# Patient Record
Sex: FEMALE | URBAN - METROPOLITAN AREA
[De-identification: names, ages, dates, MRNs, and addresses within clinical notes are randomized per-mention and may not be internally consistent; named-entity substitution may affect disease eponyms.]

---

## 2023-05-01 ENCOUNTER — APPOINTMENT (RX ONLY)
Dept: URBAN - METROPOLITAN AREA CLINIC 84 | Facility: CLINIC | Age: 63
Setting detail: DERMATOLOGY
End: 2023-05-01

## 2023-05-01 DIAGNOSIS — D18.0 HEMANGIOMA: ICD-10-CM

## 2023-05-01 DIAGNOSIS — L82.1 OTHER SEBORRHEIC KERATOSIS: ICD-10-CM

## 2023-05-01 DIAGNOSIS — T63.42 TOXIC EFFECT OF VENOM OF ANTS: ICD-10-CM

## 2023-05-01 DIAGNOSIS — L81.4 OTHER MELANIN HYPERPIGMENTATION: ICD-10-CM

## 2023-05-01 DIAGNOSIS — L57.8 OTHER SKIN CHANGES DUE TO CHRONIC EXPOSURE TO NONIONIZING RADIATION: ICD-10-CM | Status: INADEQUATELY CONTROLLED

## 2023-05-01 DIAGNOSIS — D22 MELANOCYTIC NEVI: ICD-10-CM

## 2023-05-01 DIAGNOSIS — Z71.89 OTHER SPECIFIED COUNSELING: ICD-10-CM

## 2023-05-01 DIAGNOSIS — L57.0 ACTINIC KERATOSIS: ICD-10-CM

## 2023-05-01 DIAGNOSIS — L85.3 XEROSIS CUTIS: ICD-10-CM | Status: INADEQUATELY CONTROLLED

## 2023-05-01 PROBLEM — T63.421A TOXIC EFFECT OF VENOM OF ANTS, ACCIDENTAL (UNINTENTIONAL), INITIAL ENCOUNTER: Status: ACTIVE | Noted: 2023-05-01

## 2023-05-01 PROBLEM — D18.01 HEMANGIOMA OF SKIN AND SUBCUTANEOUS TISSUE: Status: ACTIVE | Noted: 2023-05-01

## 2023-05-01 PROBLEM — D22.5 MELANOCYTIC NEVI OF TRUNK: Status: ACTIVE | Noted: 2023-05-01

## 2023-05-01 PROCEDURE — ? PRESCRIPTION MEDICATION MANAGEMENT

## 2023-05-01 PROCEDURE — 99204 OFFICE O/P NEW MOD 45 MIN: CPT | Mod: 25

## 2023-05-01 PROCEDURE — 17000 DESTRUCT PREMALG LESION: CPT

## 2023-05-01 PROCEDURE — ? LIQUID NITROGEN

## 2023-05-01 PROCEDURE — ? SUNSCREEN RECOMMENDATIONS

## 2023-05-01 PROCEDURE — ? SUNSCREEN TREATMENT REGIMEN

## 2023-05-01 PROCEDURE — ? COUNSELING

## 2023-05-01 PROCEDURE — ? PRESCRIPTION

## 2023-05-01 RX ORDER — TRIAMCINOLONE ACETONIDE 1 MG/G
CREAM TOPICAL
Qty: 30 | Refills: 1 | Status: ERX | COMMUNITY
Start: 2023-05-01

## 2023-05-01 RX ADMIN — TRIAMCINOLONE ACETONIDE: 1 CREAM TOPICAL at 00:00

## 2023-05-01 ASSESSMENT — LOCATION SIMPLE DESCRIPTION DERM
LOCATION SIMPLE: LEFT LOWER BACK
LOCATION SIMPLE: CHEST
LOCATION SIMPLE: LEFT THIGH
LOCATION SIMPLE: LEFT ANTERIOR NECK
LOCATION SIMPLE: POSTERIOR NECK
LOCATION SIMPLE: LEFT FOREARM
LOCATION SIMPLE: RIGHT UPPER ARM
LOCATION SIMPLE: RIGHT ANTERIOR NECK
LOCATION SIMPLE: RIGHT PRETIBIAL REGION
LOCATION SIMPLE: RIGHT UPPER BACK
LOCATION SIMPLE: RIGHT SHOULDER
LOCATION SIMPLE: RIGHT FOREARM
LOCATION SIMPLE: LEFT HAND
LOCATION SIMPLE: LEFT PRETIBIAL REGION
LOCATION SIMPLE: RIGHT FOREHEAD
LOCATION SIMPLE: LEFT CHEEK
LOCATION SIMPLE: ABDOMEN
LOCATION SIMPLE: LEFT FOREHEAD
LOCATION SIMPLE: RIGHT POSTERIOR UPPER ARM
LOCATION SIMPLE: RIGHT HAND
LOCATION SIMPLE: RIGHT BACK
LOCATION SIMPLE: RIGHT CHEEK
LOCATION SIMPLE: RIGHT KNEE
LOCATION SIMPLE: LEFT UPPER BACK
LOCATION SIMPLE: LEFT SHOULDER

## 2023-05-01 ASSESSMENT — LOCATION ZONE DERM
LOCATION ZONE: ARM
LOCATION ZONE: LEG
LOCATION ZONE: FACE
LOCATION ZONE: NECK
LOCATION ZONE: HAND
LOCATION ZONE: TRUNK

## 2023-05-01 ASSESSMENT — LOCATION DETAILED DESCRIPTION DERM
LOCATION DETAILED: LEFT ANTERIOR DISTAL THIGH
LOCATION DETAILED: RIGHT DISTAL POSTERIOR UPPER ARM
LOCATION DETAILED: RIGHT FOREHEAD
LOCATION DETAILED: LEFT DISTAL DORSAL FOREARM
LOCATION DETAILED: LEFT SUPERIOR UPPER BACK
LOCATION DETAILED: RIGHT PROXIMAL DORSAL FOREARM
LOCATION DETAILED: RIGHT DISTAL DORSAL FOREARM
LOCATION DETAILED: RIGHT INFERIOR ANTERIOR NECK
LOCATION DETAILED: RIGHT MID-UPPER BACK
LOCATION DETAILED: RIGHT SUPERIOR CENTRAL MALAR CHEEK
LOCATION DETAILED: RIGHT INFERIOR FOREHEAD
LOCATION DETAILED: RIGHT INFERIOR CENTRAL MALAR CHEEK
LOCATION DETAILED: LEFT MEDIAL FOREHEAD
LOCATION DETAILED: LEFT RADIAL DORSAL HAND
LOCATION DETAILED: RIGHT SUPERIOR LATERAL UPPER BACK
LOCATION DETAILED: LEFT PROXIMAL DORSAL FOREARM
LOCATION DETAILED: RIGHT LATERAL KNEE
LOCATION DETAILED: RIGHT PROXIMAL PRETIBIAL REGION
LOCATION DETAILED: RIGHT ANTERIOR DISTAL UPPER ARM
LOCATION DETAILED: LEFT MID-UPPER BACK
LOCATION DETAILED: LEFT INFERIOR FOREHEAD
LOCATION DETAILED: LEFT INFERIOR ANTERIOR NECK
LOCATION DETAILED: LEFT INFERIOR CENTRAL MALAR CHEEK
LOCATION DETAILED: RIGHT SUPERIOR UPPER BACK
LOCATION DETAILED: RIGHT POSTERIOR SHOULDER
LOCATION DETAILED: RIGHT LATERAL ABDOMEN
LOCATION DETAILED: LEFT CENTRAL MALAR CHEEK
LOCATION DETAILED: RIGHT DISTAL PRETIBIAL REGION
LOCATION DETAILED: LEFT LATERAL ABDOMEN
LOCATION DETAILED: LEFT POSTERIOR SHOULDER
LOCATION DETAILED: LEFT DISTAL PRETIBIAL REGION
LOCATION DETAILED: RIGHT CENTRAL MALAR CHEEK
LOCATION DETAILED: RIGHT CLAVICULAR NECK
LOCATION DETAILED: SUBXIPHOID
LOCATION DETAILED: RIGHT INFERIOR UPPER BACK
LOCATION DETAILED: LEFT MEDIAL SUPERIOR CHEST
LOCATION DETAILED: LEFT SUPERIOR LATERAL MIDBACK
LOCATION DETAILED: RIGHT RADIAL DORSAL HAND
LOCATION DETAILED: MID POSTERIOR NECK

## 2023-05-01 NOTE — PROCEDURE: LIQUID NITROGEN
Render Note In Bullet Format When Appropriate: No
Application Tool (Optional): Liquid Nitrogen Sprayer
Render Post-Care Instructions In Note?: yes
Duration Of Freeze Thaw-Cycle (Seconds): 4
Number Of Freeze-Thaw Cycles: 2 freeze-thaw cycles
Post-Care Instructions: I reviewed with the patient in detail post-care instructions. Patient is to wear sunprotection, and avoid picking at any of the treated lesions. Pt may apply aquaphor or cetaphil ointment to crusted or scabbing areas.  FU in 1 month to reassess and discuss further treatment options if not resolved
Consent: The patient's consent was obtained including but not limited to risks of crusting, scabbing, blistering, scarring, darker or lighter pigmentary change, recurrence, incomplete removal and infection.
Detail Level: Detailed

## 2023-10-04 ENCOUNTER — APPOINTMENT (RX ONLY)
Dept: URBAN - METROPOLITAN AREA CLINIC 84 | Facility: CLINIC | Age: 63
Setting detail: DERMATOLOGY
End: 2023-10-04

## 2023-10-04 DIAGNOSIS — L20.9 ATOPIC DERMATITIS, UNSPECIFIED: ICD-10-CM | Status: INADEQUATELY CONTROLLED

## 2023-10-04 PROCEDURE — ? PRESCRIPTION MEDICATION MANAGEMENT

## 2023-10-04 PROCEDURE — 99213 OFFICE O/P EST LOW 20 MIN: CPT

## 2023-10-04 PROCEDURE — ? COUNSELING

## 2023-10-04 ASSESSMENT — LOCATION DETAILED DESCRIPTION DERM
LOCATION DETAILED: LEFT MEDIAL MALAR CHEEK
LOCATION DETAILED: RIGHT MEDIAL MALAR CHEEK

## 2023-10-04 ASSESSMENT — LOCATION SIMPLE DESCRIPTION DERM
LOCATION SIMPLE: RIGHT CHEEK
LOCATION SIMPLE: LEFT CHEEK

## 2023-10-04 ASSESSMENT — ITCH NUMERIC RATING SCALE: HOW SEVERE IS YOUR ITCHING?: 1

## 2023-10-04 ASSESSMENT — SEVERITY ASSESSMENT 2020: SEVERITY 2020: ALMOST CLEAR

## 2023-10-04 ASSESSMENT — BSA ECZEMA: % BODY COVERED IN ECZEMA: 10

## 2023-10-04 ASSESSMENT — LOCATION ZONE DERM: LOCATION ZONE: FACE

## 2023-11-15 ENCOUNTER — APPOINTMENT (RX ONLY)
Dept: URBAN - METROPOLITAN AREA CLINIC 84 | Facility: CLINIC | Age: 63
Setting detail: DERMATOLOGY
End: 2023-11-15

## 2023-11-15 DIAGNOSIS — L20.9 ATOPIC DERMATITIS, UNSPECIFIED: ICD-10-CM | Status: INADEQUATELY CONTROLLED

## 2023-11-15 PROCEDURE — ? COUNSELING

## 2023-11-15 PROCEDURE — ? PRESCRIPTION MEDICATION MANAGEMENT

## 2023-11-15 PROCEDURE — ? ADDITIONAL NOTES

## 2023-11-15 PROCEDURE — ? PRESCRIPTION

## 2023-11-15 PROCEDURE — 99213 OFFICE O/P EST LOW 20 MIN: CPT

## 2023-11-15 RX ORDER — RUXOLITINIB 15 MG/G
CREAM TOPICAL
Qty: 60 | Refills: 6 | Status: ERX | COMMUNITY
Start: 2023-11-15

## 2023-11-15 RX ADMIN — RUXOLITINIB: 15 CREAM TOPICAL at 00:00

## 2023-11-15 ASSESSMENT — LOCATION SIMPLE DESCRIPTION DERM
LOCATION SIMPLE: LEFT CHEEK
LOCATION SIMPLE: RIGHT CHEEK

## 2023-11-15 ASSESSMENT — LOCATION DETAILED DESCRIPTION DERM
LOCATION DETAILED: LEFT MEDIAL MALAR CHEEK
LOCATION DETAILED: RIGHT MEDIAL MALAR CHEEK

## 2023-11-15 ASSESSMENT — SEVERITY ASSESSMENT 2020: SEVERITY 2020: MODERATE

## 2023-11-15 ASSESSMENT — ITCH NUMERIC RATING SCALE: HOW SEVERE IS YOUR ITCHING?: 4

## 2023-11-15 ASSESSMENT — LOCATION ZONE DERM: LOCATION ZONE: FACE

## 2023-11-15 ASSESSMENT — BSA ECZEMA: % BODY COVERED IN ECZEMA: 10

## 2023-11-15 NOTE — PROCEDURE: PRESCRIPTION MEDICATION MANAGEMENT
Initiate Treatment: Vanicream face wash daily \\nOpzelura twice daily, sample and rx sent today use twice daily \\nZyrtec suggested for daily use -Benadryl as needed
Continue Regimen: Aquaphor with 1% hydrocortisone 1-2 times daily , gentle cleanser x 1 week.
Plan: If sites do not get better patient is to return to office in 2 weeks. \\n\\nAtopic dermatitis vs actinic keratosis, ln2 tx in reserve. \\n\\n10/14/23 patient states she can only use jojoba on her face, is not using any facial cleansers. Patients diet has been cut to a minimum and is only eating meat and veggies. \\nPatient reports history of eczema\\n\\nSuggested further testing with allergist \\n\\nDupixent discussed today for chronic flares- prednisone suggested for current flare, patient is a diabetic \\nDupixent in reserve
Detail Level: Zone
Render In Strict Bullet Format?: No
Yes

## 2023-11-15 NOTE — PROCEDURE: ADDITIONAL NOTES
Additional Notes: Bloodwork req given to patient to test for NORIS rule out any autoimmune disease\\nUse vanicream cleanser\\nice packs. Suggested zyrtec. Patient has never used before. \\nWill send referral to Dr Jorge Hester due to being her 2nd episode in 1 month. Potentially could be chronic. . reviewed option of dupixent. She will think about her options further. Fu in 1 week.
Detail Level: Simple
Render Risk Assessment In Note?: no

## 2023-11-22 ENCOUNTER — APPOINTMENT (RX ONLY)
Dept: URBAN - METROPOLITAN AREA CLINIC 84 | Facility: CLINIC | Age: 63
Setting detail: DERMATOLOGY
End: 2023-11-22

## 2023-11-22 DIAGNOSIS — L20.9 ATOPIC DERMATITIS, UNSPECIFIED: ICD-10-CM | Status: RESOLVING

## 2023-11-22 PROCEDURE — ? ADDITIONAL NOTES

## 2023-11-22 PROCEDURE — ? COUNSELING

## 2023-11-22 PROCEDURE — ? PRESCRIPTION MEDICATION MANAGEMENT

## 2023-11-22 PROCEDURE — 99214 OFFICE O/P EST MOD 30 MIN: CPT

## 2023-11-22 ASSESSMENT — LOCATION SIMPLE DESCRIPTION DERM: LOCATION SIMPLE: RIGHT CHEEK

## 2023-11-22 ASSESSMENT — SEVERITY ASSESSMENT 2020: SEVERITY 2020: ALMOST CLEAR

## 2023-11-22 ASSESSMENT — LOCATION DETAILED DESCRIPTION DERM: LOCATION DETAILED: RIGHT MEDIAL MALAR CHEEK

## 2023-11-22 ASSESSMENT — LOCATION ZONE DERM: LOCATION ZONE: FACE

## 2023-11-22 NOTE — PROCEDURE: ADDITIONAL NOTES
Additional Notes: Patient will complete NORIS bloodwork next week. Suggested zyrtec. Reviewed option of dupixent again.  She will think about her options further and seek consult with ophthalmologist.
Detail Level: Simple
Render Risk Assessment In Note?: no
Additional Notes: patient reports she did get an eye exam recently due to hx of glaucoma. She doesnt thinks she needs to go back sooner. \\n\\nresume opzelura BID.  This is her 3rd flare and potentially could return again

## 2023-11-22 NOTE — PROCEDURE: PRESCRIPTION MEDICATION MANAGEMENT
Initiate Treatment: Opzelura twice daily, small amount under eye with Aquaphor barrier\\nZyrtec suggested for daily use\\nSuggested Cetafil moisturizer
Samples Given: Cetafil
Plan: If sites do not get better patient is to return to office in 2 weeks. \\n\\nAtopic dermatitis vs actinic keratosis, ln2 tx in reserve. \\n\\n10/14/23 patient states she can only use jojoba on her face, is not using any facial cleansers. Patients diet has been cut to a minimum and is only eating meat and veggies. \\nPatient reports history of eczema\\n\\nSuggested further testing with allergist\\n\\nDupixent discussed today for chronic flares- prednisone suggested for current flare, patient is a diabetic \\nDupixent in reserve
Detail Level: Zone
Render In Strict Bullet Format?: No

## 2024-02-26 NOTE — PROCEDURE: PRESCRIPTION MEDICATION MANAGEMENT
The ABCs of the Annual Wellness Visit  Subsequent Medicare Wellness Visit    Subjective    Cristiana Headley is a 69 y.o. female who presents for a Subsequent Medicare Wellness Visit.    The following portions of the patient's history were reviewed and   updated as appropriate: allergies, current medications, past family history, past medical history, past social history, past surgical history, and problem list.    Compared to one year ago, the patient feels her physical   health is the same.    Compared to one year ago, the patient feels her mental   health is the same.    Recent Hospitalizations:  She was not admitted to the hospital during the last year.       Current Medical Providers:  Patient Care Team:  Sabra Guadarrama MD as PCP - General (Internal Medicine)  Jeff Jacome MD as Consulting Physician (Gastroenterology)  Dr. Zhang Hurd, surgery  Dr. Sampson, podiatrist  Dr. Junior, [facility]    Outpatient Medications Prior to Visit   Medication Sig Dispense Refill    albuterol sulfate  (90 Base) MCG/ACT inhaler Inhale 2 puffs Every 4 (Four) Hours As Needed for Shortness of Air or Wheezing. 6.7 g 5    ezetimibe (Zetia) 10 MG tablet Take 1 tablet by mouth Daily. 90 tablet 3    fluconazole (DIFLUCAN) 100 MG tablet Take two tablets day 1 then one tablet x 21 days. #23. No refills. 23 tablet 0    glipizide (GLUCOTROL XL) 5 MG ER tablet Take 1 tablet by mouth 2 (Two) Times a Day. 180 tablet 2    losartan (COZAAR) 50 MG tablet Take 1 tablet by mouth Daily. 90 tablet 3    metFORMIN (GLUCOPHAGE) 1000 MG tablet Take 1 tablet by mouth 2 (Two) Times a Day With Meals. 180 tablet 3    pantoprazole (PROTONIX) 40 MG EC tablet Take 1 tablet by mouth Daily. 90 tablet 3    Tirzepatide (Mounjaro) 5 MG/0.5ML solution pen-injector pen Inject 0.5 mL under the skin into the appropriate area as directed 1 (One) Time Per Week. Take after you finish 4 weeks of the 2.5 mg weekly dose 2 mL 0    vitamin B-12 (CYANOCOBALAMIN) 
1000 MCG tablet Take 1 tablet by mouth Daily. 90 tablet 3    Blood Glucose Monitoring Suppl (OneTouch Verio) w/Device kit Use 1 each Daily. (Patient not taking: Reported on 3/5/2024) 1 kit 0    glucose blood test strip Use to check fasting blood sugar daily (Patient not taking: Reported on 3/5/2024) 100 each 12    Lancets (onetouch ultrasoft) lancets Use to check fasting blood sugar once daily (Patient not taking: Reported on 3/5/2024) 100 each 12    ibuprofen (ADVIL,MOTRIN) 400 MG tablet Take 1 tablet by mouth Every 6 (Six) Hours As Needed for Mild Pain. LD 4/18.. TO HOLD FOR SX      Tirzepatide (Mounjaro) 2.5 MG/0.5ML solution pen-injector pen Inject 0.5 mL under the skin into the appropriate area as directed 1 (One) Time Per Week. (Patient not taking: Reported on 3/5/2024) 2 mL 0    Tirzepatide (Mounjaro) 7.5 MG/0.5ML solution pen-injector pen Inject 0.5 mL under the skin into the appropriate area as directed 1 (One) Time Per Week. Take after you finish 4 weeks of the 5 mg weekly dose (Patient not taking: Reported on 3/5/2024) 2 mL 1     No facility-administered medications prior to visit.       No opioid medication identified on active medication list. I have reviewed chart for other potential  high risk medication/s and harmful drug interactions in the elderly.        Aspirin is not on active medication list.  Aspirin use is not indicated based on review of current medical condition/s. Risk of harm outweighs potential benefits.  .    Patient Active Problem List   Diagnosis    Cervical radiculopathy    Diabetes mellitus    Hypertension    Neck pain    Shoulder pain    Chronic cough    Routine health maintenance    Chest pain    Polyp of colon    Liver mass    Obesity (BMI 30-39.9)    Kidney stones    Dupuytren's contracture    Hepatic cirrhosis    Hepatic cyst    Candidal esophagitis     Advance Care Planning   Advance Care Planning     Advance Directive is not on file.  ACP discussion was held with the patient 
"during this visit. Patient does not have an advance directive, information provided.     Objective    Vitals:    03/05/24 1319   BP: 128/76   BP Location: Left arm   Patient Position: Sitting   Cuff Size: Large Adult   Pulse: 87   SpO2: 95%   Weight: 83.2 kg (183 lb 6.4 oz)   Height: 157.5 cm (62\")     Estimated body mass index is 33.54 kg/m² as calculated from the following:    Height as of this encounter: 157.5 cm (62\").    Weight as of this encounter: 83.2 kg (183 lb 6.4 oz).    Does the patient have evidence of cognitive impairment? No        HEALTH RISK ASSESSMENT    Smoking Status:  Social History     Tobacco Use   Smoking Status Never   Smokeless Tobacco Never     Alcohol Consumption:  Social History     Substance and Sexual Activity   Alcohol Use Never     Fall Risk Screen:    NICOLE Fall Risk Assessment was completed, and patient is at LOW risk for falls.Assessment completed on:3/5/2024    Depression Screening:      3/5/2024     1:22 PM   PHQ-2/PHQ-9 Depression Screening   Little Interest or Pleasure in Doing Things 0-->not at all   Feeling Down, Depressed or Hopeless 0-->not at all   PHQ-9: Brief Depression Severity Measure Score 0       Health Habits and Functional and Cognitive Screening:      3/5/2024     1:00 PM   Functional & Cognitive Status   Do you have difficulty preparing food and eating? No   Do you have difficulty bathing yourself, getting dressed or grooming yourself? No   Do you have difficulty using the toilet? No   Do you have difficulty moving around from place to place? No   Do you have trouble with steps or getting out of a bed or a chair? No   Current Diet Limited Junk Food   Dental Exam Up to date   Eye Exam Up to date   Exercise (times per week) 0 times per week   Current Exercises Include No Regular Exercise   Do you need help using the phone?  No   Are you deaf or do you have serious difficulty hearing?  No   Do you need help to go to places out of walking distance? No   Do you need "
Initiate Treatment: Aquaphor with 1% hydrocortisone 1-2 times daily , gentle cleanser x 1 week.
Plan: If sites do not get better patient is to return to office in 2 weeks. \\n\\nAtopic dermatitis vs actinic keratosis, ln2 tx in reserve.
help shopping? No   Do you need help preparing meals?  No   Do you need help with housework?  No   Do you need help with laundry? No   Do you need help taking your medications? No   Do you need help managing money? No   Do you ever drive or ride in a car without wearing a seat belt? No   Have you felt unusual stress, anger or loneliness in the last month? Yes   Who do you live with? Other   If you need help, do you have trouble finding someone available to you? No   Have you been bothered in the last four weeks by sexual problems? No   Do you have difficulty concentrating, remembering or making decisions? No     Age-appropriate Screening Schedule:  Refer to the list below for future screening recommendations based on patient's age, sex and/or medical conditions. Orders for these recommended tests are listed in the plan section. The patient has been provided with a written plan.    Health Maintenance   Topic Date Due    TDAP/TD VACCINES (1 - Tdap) Never done    ZOSTER VACCINE (1 of 2) Never done    Hepatitis B (1 of 3 - Risk 3-dose series) Never done    RSV Vaccine - Adults (1 - 1-dose 60+ series) Never done    COVID-19 Vaccine (1 - 2023-24 season) Never done    HEMOGLOBIN A1C  04/11/2024    LIPID PANEL  10/11/2024    URINE MICROALBUMIN  11/15/2024    BMI FOLLOWUP  11/15/2024    DIABETIC FOOT EXAM  02/12/2025    DIABETIC EYE EXAM  02/28/2025    ANNUAL WELLNESS VISIT  03/05/2025    MAMMOGRAM  11/08/2025    DXA SCAN  12/01/2025    COLORECTAL CANCER SCREENING  02/08/2029    HEPATITIS C SCREENING  Completed    INFLUENZA VACCINE  Completed    Pneumococcal Vaccine 65+  Completed          CMS Preventative Services Quick Reference  Risk Factors Identified During Encounter  Immunizations Discussed/Encouraged: Tdap, Shingrix, COVID19, and RSV (Respiratory Syncytial Virus)  Inactivity/Sedentary: Patient was advised to exercise at least 150 minutes a week per CDC recommendations.  The above risks/problems have been discussed 
"with the patient.  Pertinent information has been shared with the patient in the After Visit Summary.  An After Visit Summary and PPPS were made available to the patient.    Follow Up:   Next Medicare Wellness visit to be scheduled in 1 year.       Additional E&M Note during same encounter follows:  Patient has multiple medical problems which are significant and separately identifiable that require additional work above and beyond the Medicare Wellness Visit.      Chief Complaint  Medicare Wellness-subsequent    Subjective        HPI  Cristiana Headley is also being seen today for follow up.    She had cataract surgery with Dr. Junior, will be going back in the near future for cataract surgery on the right eye    Finishing up Diflucan for candida esophagitis as diagnosed by Dr. Jacoem on EGD. PPI really helping reflux sxs.  Tissue Pathology Exam (02/08/2024 08:33)     Currently is on Mounjaro 5 mg.  Besides burping, she has not had any side effects.  States she is still hungry on this dose of Mounjaro.  Admits that her diet is \"horrible.\"  Wondering about getting a Dexcom, does not check her blood sugar.  Remains on glipizide and metformin as well.  Due to recent cataract surgery and EGD/colonoscopy, she has missed a couple doses of Mounjaro.  She does not currently exercise.    She saw Dr. Zhang Hurd for large hepatic cyst.  He recommended observation and every 6 month ultrasounds.  She is not having any symptoms from this.    She saw Dr. Sampson, her podiatrist, recently had an ingrown toenail addressed. Checks feet daily. Looking into getting some good shoes for walking.      Objective   Vital Signs:  /76 (BP Location: Left arm, Patient Position: Sitting, Cuff Size: Large Adult)   Pulse 87   Ht 157.5 cm (62\")   Wt 83.2 kg (183 lb 6.4 oz)   SpO2 95%   BMI 33.54 kg/m²     Physical Exam  Constitutional:       General: She is not in acute distress.     Appearance: Normal appearance.   HENT:      Head: "
Detail Level: Zone
Normocephalic and atraumatic.   Eyes:      Conjunctiva/sclera: Conjunctivae normal.   Cardiovascular:      Rate and Rhythm: Normal rate.   Pulmonary:      Effort: Pulmonary effort is normal.   Musculoskeletal:         General: No swelling or deformity.   Skin:     Coloration: Skin is not jaundiced.      Findings: No rash.   Neurological:      General: No focal deficit present.      Mental Status: She is alert and oriented to person, place, and time.   Psychiatric:         Mood and Affect: Mood normal.         Behavior: Behavior normal.         Judgment: Judgment normal.          The following data was reviewed by: Sabra Guadarrama MD on 03/05/2024:          US Liver (12/22/2023 09:27)   Comprehensive Metabolic Panel (12/12/2023 15:05)   Tissue Pathology Exam (02/08/2024 08:33)   COLONOSCOPY (02/08/2024 08:15)  UPPER GI ENDOSCOPY (02/08/2024 08:16)     Assessment and Plan   Diagnoses and all orders for this visit:    1. Medicare annual wellness visit, subsequent (Primary)    2. Hepatic cirrhosis, unspecified hepatic cirrhosis type, unspecified whether ascites present  Assessment & Plan:  Follows with GI. States Dr. Hurd noted that perhaps she does not have cirrhosis. Either way, she does have chronic liver inflammation as evidenced by elevated LFTs and has large liver cyst. Check HBV immunity status to see if we need to vaccinate against HBV.     Orders:  -     Hepatitis B Surface Antibody    3. Type 2 diabetes mellitus without complication, without long-term current use of insulin  Assessment & Plan:  A1c: Today  Dilated Eye Exam: UTD, recent and upcoming cataract surgery  Urine microalbumin: Microalbumin / Creatinine Urine Ratio - Urine, Clean Catch (11/15/2023 08:47)   On ACE/ARB? Yes  Statin: No, on Zetia due to elevated LFTs. Can send a note to GI to get thoughts on statin given chronic LFT elevation  Pneumococcal vaccination: UTD     Remains on metformin 1000 mg twice a day, glipizide 5 mg extended release 
Render In Strict Bullet Format?: No
"twice daily, and Mounjaro 5 mg weekly, with plans to increase to 7.5 mg weekly.  Patient states she still does not feel full while on the Mounjaro.  She admits her diet is \"horrible\" and that she needs to improve it.  She previously has been referred to a dietitian.  I also recommended increasing exercise.  Will get A1c and we will see where we stand.  She is also wondering if she would be a candidate for Dexcom.  Referred to dietician.     Orders:  -     Cancel: Hemoglobin A1c  -     Hemoglobin A1c    4. Immunity status testing  -     Hepatitis B Surface Antibody    5. Encounter for screening for other viral diseases  -     Hepatitis B Surface Antibody    6. Chest pain, unspecified type  Assessment & Plan:  Saw cardiology, ultimately normal stress test      7. Hepatic cyst  Assessment & Plan:  Rather large, over 10 cm.  Patient is asymptomatic.  She was evaluated by Dr. Zhang Hurd who recommended monitoring liver ultrasound every 6 months.  I will reach out to GI to see if they prefer to order the ultrasound.       8. Candidal esophagitis  Assessment & Plan:  Finishing up Diflucan. PPI really helping her sxs. Needs GI appt, does not have appt scheduled yet, will reach out to GI about this as well.       9. Routine health maintenance  Assessment & Plan:  COLONOSCOPY (02/08/2024 08:15) Repeat 2029  Gave pt a list of recommended vaccines to get at a local pharmacy  DEXA Bone Density Axial (12/01/2023 14:45) Osteopenia, due 12/2025  Mammo Screening Digital Tomosynthesis Bilateral With CAD (11/08/2023 14:23) Due 11/2024  Discussed and recommended filling out living will      10. Primary hypertension  Assessment & Plan:  Controlled on Losartan, continue  Renal function and K acceptable 12/2023  Comprehensive Metabolic Panel (12/12/2023 15:05)                Follow Up   Return in about 3 months (around 6/5/2024), or if symptoms worsen or fail to improve, for Recheck, Next scheduled follow up.  Patient was given "
Plan: opzelura reviewed vs oral tx. pt declines RX today\\Cristina meantime, suggested anti-histamine\\nphotos taken.
instructions and counseling regarding her condition or for health maintenance advice. Please see specific information pulled into the AVS if appropriate.